# Patient Record
Sex: FEMALE | Race: BLACK OR AFRICAN AMERICAN | NOT HISPANIC OR LATINO | URBAN - METROPOLITAN AREA
[De-identification: names, ages, dates, MRNs, and addresses within clinical notes are randomized per-mention and may not be internally consistent; named-entity substitution may affect disease eponyms.]

---

## 2022-07-19 ENCOUNTER — EMERGENCY (EMERGENCY)
Facility: HOSPITAL | Age: 52
LOS: 0 days | Discharge: PSYCHIATRIC OR SUBSTANCE USE FACILITY | End: 2022-07-20
Attending: STUDENT IN AN ORGANIZED HEALTH CARE EDUCATION/TRAINING PROGRAM

## 2022-07-19 VITALS
SYSTOLIC BLOOD PRESSURE: 132 MMHG | WEIGHT: 119.93 LBS | RESPIRATION RATE: 18 BRPM | HEART RATE: 97 BPM | TEMPERATURE: 100 F | DIASTOLIC BLOOD PRESSURE: 88 MMHG | OXYGEN SATURATION: 100 % | HEIGHT: 62 IN

## 2022-07-19 VITALS
OXYGEN SATURATION: 99 % | RESPIRATION RATE: 18 BRPM | DIASTOLIC BLOOD PRESSURE: 79 MMHG | TEMPERATURE: 98 F | HEART RATE: 79 BPM | SYSTOLIC BLOOD PRESSURE: 136 MMHG

## 2022-07-19 DIAGNOSIS — F31.9 BIPOLAR DISORDER, UNSPECIFIED: ICD-10-CM

## 2022-07-19 DIAGNOSIS — Z85.07 PERSONAL HISTORY OF MALIGNANT NEOPLASM OF PANCREAS: ICD-10-CM

## 2022-07-19 DIAGNOSIS — R45.850 HOMICIDAL IDEATIONS: ICD-10-CM

## 2022-07-19 DIAGNOSIS — F33.2 MAJOR DEPRESSIVE DISORDER, RECURRENT SEVERE WITHOUT PSYCHOTIC FEATURES: ICD-10-CM

## 2022-07-19 DIAGNOSIS — N28.9 DISORDER OF KIDNEY AND URETER, UNSPECIFIED: ICD-10-CM

## 2022-07-19 DIAGNOSIS — Z79.82 LONG TERM (CURRENT) USE OF ASPIRIN: ICD-10-CM

## 2022-07-19 DIAGNOSIS — R45.851 SUICIDAL IDEATIONS: ICD-10-CM

## 2022-07-19 DIAGNOSIS — Z20.822 CONTACT WITH AND (SUSPECTED) EXPOSURE TO COVID-19: ICD-10-CM

## 2022-07-19 DIAGNOSIS — R74.01 ELEVATION OF LEVELS OF LIVER TRANSAMINASE LEVELS: ICD-10-CM

## 2022-07-19 DIAGNOSIS — I10 ESSENTIAL (PRIMARY) HYPERTENSION: ICD-10-CM

## 2022-07-19 LAB
ALBUMIN SERPL ELPH-MCNC: 3.2 G/DL — LOW (ref 3.3–5)
ALP SERPL-CCNC: 271 U/L — HIGH (ref 40–120)
ALT FLD-CCNC: 108 U/L — HIGH (ref 12–78)
AMPHET UR-MCNC: NEGATIVE — SIGNIFICANT CHANGE UP
ANION GAP SERPL CALC-SCNC: 7 MMOL/L — SIGNIFICANT CHANGE UP (ref 5–17)
APAP SERPL-MCNC: <2 UG/ML — LOW (ref 10–30)
AST SERPL-CCNC: 72 U/L — HIGH (ref 15–37)
BARBITURATES UR SCN-MCNC: NEGATIVE — SIGNIFICANT CHANGE UP
BASOPHILS # BLD AUTO: 0.01 K/UL — SIGNIFICANT CHANGE UP (ref 0–0.2)
BASOPHILS NFR BLD AUTO: 0.3 % — SIGNIFICANT CHANGE UP (ref 0–2)
BENZODIAZ UR-MCNC: NEGATIVE — SIGNIFICANT CHANGE UP
BILIRUB SERPL-MCNC: 0.4 MG/DL — SIGNIFICANT CHANGE UP (ref 0.2–1.2)
BUN SERPL-MCNC: 17 MG/DL — SIGNIFICANT CHANGE UP (ref 7–23)
CALCIUM SERPL-MCNC: 9 MG/DL — SIGNIFICANT CHANGE UP (ref 8.5–10.1)
CHLORIDE SERPL-SCNC: 115 MMOL/L — HIGH (ref 96–108)
CO2 SERPL-SCNC: 23 MMOL/L — SIGNIFICANT CHANGE UP (ref 22–31)
COCAINE METAB.OTHER UR-MCNC: NEGATIVE — SIGNIFICANT CHANGE UP
CREAT SERPL-MCNC: 1.33 MG/DL — HIGH (ref 0.5–1.3)
EGFR: 48 ML/MIN/1.73M2 — LOW
EOSINOPHIL # BLD AUTO: 0.03 K/UL — SIGNIFICANT CHANGE UP (ref 0–0.5)
EOSINOPHIL NFR BLD AUTO: 0.9 % — SIGNIFICANT CHANGE UP (ref 0–6)
ETHANOL SERPL-MCNC: <10 MG/DL — SIGNIFICANT CHANGE UP (ref 0–10)
FLUAV AG NPH QL: SIGNIFICANT CHANGE UP
FLUBV AG NPH QL: SIGNIFICANT CHANGE UP
GLUCOSE SERPL-MCNC: 89 MG/DL — SIGNIFICANT CHANGE UP (ref 70–99)
HCG SERPL-ACNC: 3 MIU/ML — SIGNIFICANT CHANGE UP
HCG UR QL: NEGATIVE — SIGNIFICANT CHANGE UP
HCT VFR BLD CALC: 31 % — LOW (ref 34.5–45)
HGB BLD-MCNC: 9.6 G/DL — LOW (ref 11.5–15.5)
IMM GRANULOCYTES NFR BLD AUTO: 0.3 % — SIGNIFICANT CHANGE UP (ref 0–1.5)
LYMPHOCYTES # BLD AUTO: 1.09 K/UL — SIGNIFICANT CHANGE UP (ref 1–3.3)
LYMPHOCYTES # BLD AUTO: 33.9 % — SIGNIFICANT CHANGE UP (ref 13–44)
MCHC RBC-ENTMCNC: 31 G/DL — LOW (ref 32–36)
MCHC RBC-ENTMCNC: 33.2 PG — SIGNIFICANT CHANGE UP (ref 27–34)
MCV RBC AUTO: 107.3 FL — HIGH (ref 80–100)
METHADONE UR-MCNC: NEGATIVE — SIGNIFICANT CHANGE UP
MONOCYTES # BLD AUTO: 0.29 K/UL — SIGNIFICANT CHANGE UP (ref 0–0.9)
MONOCYTES NFR BLD AUTO: 9 % — SIGNIFICANT CHANGE UP (ref 2–14)
NEUTROPHILS # BLD AUTO: 1.79 K/UL — LOW (ref 1.8–7.4)
NEUTROPHILS NFR BLD AUTO: 55.6 % — SIGNIFICANT CHANGE UP (ref 43–77)
NRBC # BLD: 0 /100 WBCS — SIGNIFICANT CHANGE UP (ref 0–0)
OPIATES UR-MCNC: POSITIVE — SIGNIFICANT CHANGE UP
PCP SPEC-MCNC: SIGNIFICANT CHANGE UP
PCP UR-MCNC: NEGATIVE — SIGNIFICANT CHANGE UP
PLATELET # BLD AUTO: 167 K/UL — SIGNIFICANT CHANGE UP (ref 150–400)
POTASSIUM SERPL-MCNC: 3.9 MMOL/L — SIGNIFICANT CHANGE UP (ref 3.5–5.3)
POTASSIUM SERPL-SCNC: 3.9 MMOL/L — SIGNIFICANT CHANGE UP (ref 3.5–5.3)
PROT SERPL-MCNC: 6.7 GM/DL — SIGNIFICANT CHANGE UP (ref 6–8.3)
RBC # BLD: 2.89 M/UL — LOW (ref 3.8–5.2)
RBC # FLD: 13.2 % — SIGNIFICANT CHANGE UP (ref 10.3–14.5)
SALICYLATES SERPL-MCNC: 7 MG/DL — SIGNIFICANT CHANGE UP (ref 2.8–20)
SARS-COV-2 RNA SPEC QL NAA+PROBE: SIGNIFICANT CHANGE UP
SODIUM SERPL-SCNC: 145 MMOL/L — SIGNIFICANT CHANGE UP (ref 135–145)
THC UR QL: NEGATIVE — SIGNIFICANT CHANGE UP
WBC # BLD: 3.22 K/UL — LOW (ref 3.8–10.5)
WBC # FLD AUTO: 3.22 K/UL — LOW (ref 3.8–10.5)

## 2022-07-19 PROCEDURE — 99285 EMERGENCY DEPT VISIT HI MDM: CPT

## 2022-07-19 PROCEDURE — 93010 ELECTROCARDIOGRAM REPORT: CPT

## 2022-07-19 PROCEDURE — 90792 PSYCH DIAG EVAL W/MED SRVCS: CPT | Mod: 95

## 2022-07-19 RX ORDER — QUETIAPINE FUMARATE 200 MG/1
0 TABLET, FILM COATED ORAL
Qty: 0 | Refills: 0 | DISCHARGE

## 2022-07-19 RX ORDER — POTASSIUM CHLORIDE 20 MEQ
10 PACKET (EA) ORAL
Qty: 0 | Refills: 0 | DISCHARGE

## 2022-07-19 RX ORDER — ENOXAPARIN SODIUM 100 MG/ML
0 INJECTION SUBCUTANEOUS
Qty: 0 | Refills: 0 | DISCHARGE

## 2022-07-19 RX ORDER — PANTOPRAZOLE SODIUM 20 MG/1
1 TABLET, DELAYED RELEASE ORAL
Qty: 0 | Refills: 0 | DISCHARGE

## 2022-07-19 RX ORDER — LIPASE/PROTEASE/AMYLASE 16-48-48K
2 CAPSULE,DELAYED RELEASE (ENTERIC COATED) ORAL
Qty: 0 | Refills: 0 | DISCHARGE

## 2022-07-19 RX ORDER — BUPROPION HYDROCHLORIDE 150 MG/1
300 TABLET, EXTENDED RELEASE ORAL
Qty: 0 | Refills: 0 | DISCHARGE

## 2022-07-19 RX ORDER — ASPIRIN/CALCIUM CARB/MAGNESIUM 324 MG
0 TABLET ORAL
Qty: 0 | Refills: 0 | DISCHARGE

## 2022-07-19 NOTE — ED PROVIDER NOTE - ATTENDING APP SHARED VISIT CONTRIBUTION OF CARE
ushah: 52f with bipolar p/w si and plan, denies ingestion; will c/w psych, 1:1, re-assess    NAD  EOMI  airway patent  +S1S2 no mrg  CTA b/l  soft nt nd  no le edema;  +SI

## 2022-07-19 NOTE — ED ADULT NURSE REASSESSMENT NOTE - NS ED NURSE REASSESS COMMENT FT1
pt is alert and awake. Pt is calm and cooperative. 1:1 remains in place. Awaiting labs and transfer. Will continue to monitor. Safety maintained

## 2022-07-19 NOTE — ED BEHAVIORAL HEALTH ASSESSMENT NOTE - RISK ASSESSMENT
High Acute Suicide Risk risk factors include suicide ideation w plan and preparatory action, prior attempt, anhedonia, recent separation from , prior hospitalizations, prior substance use disorder, recovering from pancreatic cancer. protective factors include engagement in treatment, treatment seeking behavior, sobriety x 4 years, supportive family, lack of psychosis, lack of access to firearm
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
## 2022-07-19 NOTE — ED PROVIDER NOTE - PROGRESS NOTE DETAILS
bronson: 52f with bipolar p/w si and plan, denies ingestion; will c/w psych teke psych aware Pt is medically cleared -- anemia is chronic and cancer is in remission, pt is well appearing w/o medical complaints and normal physical exam.

## 2022-07-19 NOTE — ED BEHAVIORAL HEALTH NOTE - BEHAVIORAL HEALTH NOTE
PRE-HOSPITAL COURSE:  SOURCE:  Second-hand information via EMR documentation and dayshift RN Aura.   DETAILS: Patient [facility] with c/o depression and SI.   ED COURSE:  SOURCE: Second-hand information via EMR documentation and dayshift RN Aura.  ARRIVAL:  Patient [facility] with c/o depression and SI.  BELONGINGS:  Clothing.  BEHAVIOR: Upon arrival patient noted to be tearful with racing thoughts – states her  is having an affair, while she is dealing with medical ailments which is the precipitating trigger for her SI. She states he has thoughts of crashing her car; however named her daughter and parents as protective factors. She denies HI, AVH and delusions. She is on a 1:1, AXO3 and gowned. Blood and urine obtained. Her eye contact and appearance are WNL. No aggression or behavioral issues reported.   TREATMENT: No security interventions, prns or behavioral modifications required.    VISITORS: Unaccompanied by family or social supports. PRE-HOSPITAL COURSE:  SOURCE:  Second-hand information via EMR documentation and dayshift RN Aura.   DETAILS: Patient [facility] with c/o depression and SI.   ED COURSE:  SOURCE: Second-hand information via EMR documentation and dayshift RN Aura.  ARRIVAL:  Patient [facility] with c/o depression and SI.  BELONGINGS:  Clothing.  BEHAVIOR: Upon arrival patient noted to be tearful with racing thoughts – states her  is having an affair, while she is dealing with medical ailments which is the precipitating trigger for her SI. She states she has thoughts of crashing her car; however named her daughter and parents as protective factors. She denies HI, AVH and delusions. She is on a 1:1, AXO3 and gowned. Blood and urine obtained. Her eye contact and appearance are WNL. No aggression or behavioral issues reported.   TREATMENT: No security interventions, prns or behavioral modifications required.    VISITORS: Unaccompanied by family or social supports.
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
## 2022-07-19 NOTE — ED BEHAVIORAL HEALTH ASSESSMENT NOTE - MEDICAL ISSUES AND PLAN (INCLUDE STANDING AND PRN MEDICATION)
please document that patient is in remission from pancreatic cancer, that patient is medically cleared despite anemia. please obtain pregnancy test (SO requests these). Continue pantoprazol 40mg PO qDay, enoxaparin 40mg qDay, opium tincture 0.6mg PO TID (confirmed via ISTOP), will need to clarify creon dosage mey Tamayo at [facility] in [place].
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
## 2022-07-19 NOTE — ED PROVIDER NOTE - CLINICAL SUMMARY MEDICAL DECISION MAKING FREE TEXT BOX
pt with h/o bipolar disorder pw si and hi towards  with normal exam and no medical complaints in the ED, pending labs (etoh) will consult telepsych

## 2022-07-19 NOTE — ED BEHAVIORAL HEALTH ASSESSMENT NOTE - NSBHSAOPI_PSY_A_CORE FT
reports prior opiate addiction clean x 4 years. She reports that she gets opium tincture TID for diarrhea related to pancreatic cancer (ISTOP Confirmed)

## 2022-07-19 NOTE — ED PROVIDER NOTE - IV ALTEPLASE INCLUSION HIDDEN
HPI:   Beni Durán is a [de-identified]year old female who presents for a MA (Medicare Advantage) Supervisit (Once per calendar year).     Patient reports that 1 week ago she arrived from [facility], she was treated for COVID-19 virus for 18 days at the hosp Active Problem List:     Osteoporosis     Essential hypertension with goal blood pressure less than 130/85     Hyperlipidemia LDL goal <100     Tortuous aorta (HCC)     History of glaucoma    Wt Readings from Last 3 Encounters:  07/28/21 : 144 lb 4 oz (65. show drugs.     REVIEW OF SYSTEMS:   Review of Systems   ROS:     Constitutional:  Negative for decreased activity, fever, irritability and lethargy  Cardiovascular:  Negative for chest pain and irregular heartbeat/palpitations  Respiratory:  Negative for cough responses: No I avoid social activities because I cannot hear well and fear I will reply improperly: No   Family members and friends have told me they think I may have hearing loss:  Yes                Visual Acuity                           Physical Exam RELEVANT CHRONIC CONDITIONS:   Jenaro De La Vega is a [de-identified]year old female who presents for a Medicare Assessment.      PLAN SUMMARY:   Diagnoses and all orders for this visit:    Physical exam, annual  Patient is up-to-date with vaccinations  History of your appetite been poor?: No  How does the patient maintain a good energy level?: Appropriate Exercise  How would you describe your daily physical activity?: Light  How would you describe your current health state?: Good  How do you maintain positive menta Sigmoidoscopy   Covered every 4 years -    Fecal Occult Blood Test Covered annually -   Bone Density Screening    Bone density screening    Covered every 2 years after age 72 if diagnosed with risk of osteoporosis or estrogen deficiency.     Covered yearly 07/28/2021       Drug Serum Conc Annually No results found for: DIGOXIN, DIG, VALP

## 2022-07-19 NOTE — ED ADULT TRIAGE NOTE - SOURCE OF INFORMATION
Chief Complaint       Chest Pain (x 3 days)      History of Present Illness   Source of history provided by:  patient. Matthew Shaver is a 25 y.o. old female presenting to the walk in clinic for evaluation of intermittent chest pain which has been present for the past 3 days. Patient states that the pain is located substernally and radiates up into her throat. She describes it as a burning sensation. She states that her symptoms seem to be exacerbated by laying down. She reports mild associated shortness of breath with acute pain episodes. States the pain does not radiate to the left/right shoulder, jaw, through the back, or to the opposite side of the chest.  Pain is not worse with exertion. Pt has not had pain like this before. She has not tried taking anything over-the-counter for symptomatic relief. Denies any N/V, diaphoresis, HA, fever, cough, or recent illness. No dizziness, syncope, palpitations, or edema. Denies any hx of asthma or tobacco use. Pt denies any recent sick exposures. Denies history of previous DVT/PE, recent travel/surgery/hospitalization, hormone use, tobacco use, or history of cancer. ROS    Unless otherwise stated in this report or unable to obtain because of the patient's clinical or mental status as evidenced by the medical record, this patients's positive and negative responses for Review of Systems, constitutional, psych, eyes, ENT, cardiovascular, respiratory, gastrointestinal, neurological, genitourinary, musculoskeletal, integument systems and systems related to the presenting problem are either stated in the preceding or were not pertinent or were negative for the symptoms and/or complaints related to the medical problem. Past Medical History:  has no past medical history on file. Past Surgical History:  has no past surgical history on file. Social History:  reports that she has never smoked.  She has never used smokeless tobacco.  Family History: family history is not on file. Allergies: Patient has no known allergies. Physical Exam         VS:  /72   Pulse 92   Temp 97.6 °F (36.4 °C) (Temporal)   Wt 142 lb (64.4 kg)   SpO2 99%    Oxygen Saturation Interpretation: Normal.    General Appearance/Constitutional:  Alert, development consistent with age, NAD. HEENT:  NC/NT. Airway patent. Neck:  Normal ROM. Supple. Lungs:  CTAB without wheezing, rales, or rhonchi. Heart:  Regular rate and rhythm, normal heart sounds, without pathological murmurs, ectopy, gallops, or rubs. Chest:  Symmetrical without visible rash or tenderness. Back:  No costovertebral tenderness. Skin:  Normal turgor. Warm, dry, without visible rash, unless noted elsewhere. Neurological:  Oriented. Motor functions intact. Psychiatric:  Pleasant and appropriate. Mood and affect are normal.    Lab / Imaging Results   (All laboratory and radiology results have been personally reviewed by myself)  Labs:  No results found for this visit on 10/21/21. Imaging: All Radiology results interpreted by Radiologist unless otherwise noted. Assessment / Plan     Impression(s):  Jennifer Norton was seen today for chest pain. Diagnoses and all orders for this visit:    Gastroesophageal reflux disease without esophagitis  -     omeprazole (PRILOSEC) 40 MG delayed release capsule; Take 1 capsule by mouth daily    Chest pain, unspecified type  -     EKG 12 lead; Future  -     EKG 12 lead  -     XR CHEST STANDARD (2 VW); Future  -     XR CHEST (2 VW)        Disposition:  Disposition: Discharge to home. Vitals reviewed which are stable. Wells score for PE is 0. EKG performed in office which revealed normal sinus rhythm without ST elevation, T inversion, or the presence of Q waves. Patient was also sent for a stat chest x-ray which came back negative for any acute process. Based on clinical picture and history, symptoms are most consistent with GERD.   Prescription written for Prilosec, side effects discussed. Patient advised to avoid spicy foods, caffeine, and alcohol to prevent exacerbation. Also advised to eat small meals frequently throughout the day and avoid eating immediately before bedtime to prevent exacerbation. Follow-up with PCP in 1 week if symptoms persist for recheck and further evaluation. ER immediately if symptoms worsen or change. ER with the development of severe worsening chest pain, dyspnea, palpitations, edema, dizziness, syncope, severe abdominal pain, vomiting, or fever. Patient states understanding and is in agreement with this care plan. All questions answered. Nahum Cody PA-C    **This report was transcribed using voice recognition software. Every effort was made to ensure accuracy; however, inadvertent computerized transcription errors may be present. Patient/EMS

## 2022-07-19 NOTE — ED PROVIDER NOTE - NS ED ATTENDING STATEMENT MOD
This was a shared visit with the BRAN. I reviewed and verified the documentation and independently performed the documented:

## 2022-07-19 NOTE — ED BEHAVIORAL HEALTH ASSESSMENT NOTE - CURRENT MEDICATION
Wellbutrin XL 300mg PO qDay, seroquel 350mg PO qHS  enoxaparin 40mg qDay (reports that she had to have hepatic stenting), opium tincture 0.6mg PO TID, potassium 10mg PO qDay, pantoprazole 40mg PO qDay, enalopril 10mg PO qDay, creon (unclear dose), OTC supplements for vitamins D3, A, iron, zinc
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
## 2022-07-19 NOTE — ED BEHAVIORAL HEALTH ASSESSMENT NOTE - DIFFERENTIAL
MDD recurrent severe without psychosis, r/o bipolar currently depressed, adjustment disorder, mood disorder secondary to general medical condition, adjustment

## 2023-05-17 PROBLEM — C25.9 MALIGNANT NEOPLASM OF PANCREAS, UNSPECIFIED: Chronic | Status: ACTIVE | Noted: 2022-07-19

## 2023-05-17 PROBLEM — I10 ESSENTIAL (PRIMARY) HYPERTENSION: Chronic | Status: ACTIVE | Noted: 2022-07-19

## 2023-05-17 PROBLEM — F32.9 MAJOR DEPRESSIVE DISORDER, SINGLE EPISODE, UNSPECIFIED: Chronic | Status: ACTIVE | Noted: 2022-07-19

## 2023-05-18 PROBLEM — Z00.00 ENCOUNTER FOR PREVENTIVE HEALTH EXAMINATION: Status: ACTIVE | Noted: 2023-05-18

## 2023-06-26 ENCOUNTER — NON-APPOINTMENT (OUTPATIENT)
Age: 53
End: 2023-06-26

## 2023-06-26 ENCOUNTER — APPOINTMENT (OUTPATIENT)
Dept: OTOLARYNGOLOGY | Facility: CLINIC | Age: 53
End: 2023-06-26
Payer: COMMERCIAL

## 2023-06-26 VITALS
HEIGHT: 62 IN | SYSTOLIC BLOOD PRESSURE: 132 MMHG | DIASTOLIC BLOOD PRESSURE: 83 MMHG | BODY MASS INDEX: 20.24 KG/M2 | WEIGHT: 110 LBS | TEMPERATURE: 97.3 F | HEART RATE: 85 BPM

## 2023-06-26 PROCEDURE — 99203 OFFICE O/P NEW LOW 30 MIN: CPT

## 2023-06-26 RX ORDER — QUETIAPINE 400 MG/1
400 TABLET, FILM COATED ORAL
Refills: 0 | Status: ACTIVE | COMMUNITY

## 2023-06-26 RX ORDER — BUPROPION HYDROCHLORIDE 100 MG/1
TABLET, FILM COATED ORAL
Refills: 0 | Status: ACTIVE | COMMUNITY

## 2023-06-26 RX ORDER — MORPHINE TINCTURE 1 G/100ML
SOLUTION ORAL
Refills: 0 | Status: ACTIVE | COMMUNITY

## 2023-06-26 RX ORDER — ENALAPRIL MALEATE 10 MG/1
10 TABLET ORAL
Refills: 0 | Status: ACTIVE | COMMUNITY

## 2023-06-26 RX ORDER — LORAZEPAM 2 MG/1
TABLET ORAL
Refills: 0 | Status: ACTIVE | COMMUNITY

## 2023-06-26 RX ORDER — ENOXAPARIN SODIUM 300 MG/3ML
INJECTION INTRAVENOUS; SUBCUTANEOUS
Refills: 0 | Status: ACTIVE | COMMUNITY

## 2023-06-26 RX ORDER — PANTOPRAZOLE 40 MG/1
40 TABLET, DELAYED RELEASE ORAL
Refills: 0 | Status: ACTIVE | COMMUNITY

## 2023-06-26 NOTE — ASSESSMENT
[FreeTextEntry1] : 53 year old female with hx of pancreatic cancer presents with bilateral parotid gland swelling. On exam, clear flow bilateral parotid glands. \par - CT scan indicated enlarged bilateral heterogenous parotid with subcentimeter sialoliths. \par \par \par Discussed options for recurrent parotid sialoadenitis- observation with conservative management versus interventional sialoendoscopy vs excision of gland. \par Discussed details of the regiment/procedures and risks of each including but not limited to:\par interventional sialoendoscopy - bleeding, infection, scarring, inability to remove stone, ductal perforation/avulsion, injury to surrounding nerves, seroma, persistent or worsening of sx.\par Combined approach - increased risk of bleeding and infections injury to surrounding nerves including facial nerved and sensory nerves. \par complete gland removal - external scar, injury to facial nerve etc. \par \par will start with conservative Tx - not painful or get variable swelling but are enlarged all the time, will see where stones are - i nthe gland vs duct. if in the gland may consider office dilation vs obs\par Will proceed with regiment to increase salivary flow to reduce pooling in the gland and washout any possible obstruction if possible. Recommended hydration, regular massage, sour candies, and warm compress\par

## 2023-06-26 NOTE — CONSULT LETTER
[Please see my note below.] : Please see my note below. [FreeTextEntry1] : Dear Dr. SONIA JACKSON \par I had the pleasure of evaluating your patient JOAO HOBSON, thank you for allowing us to participate in their care. please see full note detailing our visit below.\par If you have any questions, please do not hesitate to call me and I would be happy to discuss further. \par \par Chucho Sanchez M.D.\par Attending Physician,  \par Department of Otolaryngology - Head and Neck Surgery\par [facility] \par Office: [phone]\par Fax: [phone]\par

## 2023-06-26 NOTE — HISTORY OF PRESENT ILLNESS
[de-identified] : 53 year old with hx of pancreatic cancer and has had salivary stones in both cheeks for about a year referred to us by Dr Canada at [facility]. Patient states puffiness flares up at times and goes down, but usually consistently swollen. Denies pain. Denies dry mouth or eyes. Occasional stiff neck. Had 9 months of chemo and 1 month of radiation for cancer, had a whipple surgery too. \par \par CT scan indicated enlarged bilateral heterogenous parotid with subcentimeter sialoliths.

## 2023-06-26 NOTE — END OF VISIT
[FreeTextEntry3] : I personally saw and examined  the patient in detail.  I spoke to VIDAL Meadows regarding the assessment and plan of care. I performed the procedures and relevant physical exam.  I have reviewed the above assessment and plan of care and I agree.  I have made changes to the body of the note wherever necessary and appropriate

## 2023-08-14 ENCOUNTER — APPOINTMENT (OUTPATIENT)
Dept: OTOLARYNGOLOGY | Facility: CLINIC | Age: 53
End: 2023-08-14
Payer: COMMERCIAL

## 2023-08-14 VITALS
SYSTOLIC BLOOD PRESSURE: 138 MMHG | HEART RATE: 81 BPM | BODY MASS INDEX: 20.24 KG/M2 | WEIGHT: 110 LBS | TEMPERATURE: 97.2 F | HEIGHT: 62 IN | DIASTOLIC BLOOD PRESSURE: 86 MMHG

## 2023-08-14 PROCEDURE — 99214 OFFICE O/P EST MOD 30 MIN: CPT

## 2023-08-14 NOTE — ASSESSMENT
[FreeTextEntry1] : 53 year old female with hx of pancreatic cancer presents with bilateral parotid gland swelling. On exam, clear flow bilateral parotid glands.  - CT scan indicated enlarged bilateral heterogenous parotid with subcentimeter sialoliths.  CT images reviewed - looked to be in the gland, no clear stones seen in the duct   Discussed options for recurrent parotid sialoadenitis- observation with conservative management versus interventional sialoendoscopy vs excision of gland.  Discussed details of the regiment/procedures and risks of each including but not limited to: interventional sialoendoscopy or office sialodocoplasty - bleeding, infection, scarring, inability to remove stone, ductal perforation/avulsion, injury to surrounding nerves, seroma, persistent or worsening of sx. Combined approach - increased risk of bleeding and infections injury to surrounding nerves including facial nerved and sensory nerves.  complete gland removal - external scar, injury to facial nerve etc.  Would like to do sialoendoscopy. Will book for procedure.  will start with conservative Tx - not painful or get variable swelling but are enlarged all the time, unclear if procedure will fix it, as may be full gland enlargement, she would like to try a dilation/sialodocoplasty, she is aware has a chance of getting worse  Will proceed with regiment to increase salivary flow to reduce pooling in the gland and washout any possible obstruction if possible. Recommended hydration, regular massage, sour candies, and warm compress - will book for bilateral parotid sialodochoplasty in office to see if this helps. Patient does take anticoagulation

## 2023-08-14 NOTE — CONSULT LETTER
[Please see my note below.] : Please see my note below. [FreeTextEntry1] : Dear Dr. SONIA JACKSON \par  I had the pleasure of evaluating your patient JOAO HOBSON, thank you for allowing us to participate in their care. please see full note detailing our visit below.\par  If you have any questions, please do not hesitate to call me and I would be happy to discuss further. \par  \par  Chucho Sanchez M.D.\par  Attending Physician,  \par  Department of Otolaryngology - Head and Neck Surgery\par  [facility] \par  Office: [phone]\par  Fax: [phone]\par

## 2023-08-14 NOTE — HISTORY OF PRESENT ILLNESS
[de-identified] : 53 year old with hx of pancreatic cancer and has had salivary stones in both cheeks for about a year referred to us by Dr Canada at [facility]. Patient states puffiness flares up at times and goes down, but usually consistently swollen. Denies pain. Denies dry mouth or eyes. Occasional stiff neck. Had 9 months of chemo and 1 month of radiation for cancer, had a whipple surgery too. \par  \par  CT scan indicated enlarged bilateral heterogenous parotid with subcentimeter sialoliths.  [FreeTextEntry1] : Pt following up for b/l paroid gland swelling. Pt states feeling better. Denies pain. Experiencing occasional stiff neck. Continues to do the facial massages. Still notices mild swelling.

## 2023-08-23 ENCOUNTER — NON-APPOINTMENT (OUTPATIENT)
Age: 53
End: 2023-08-23

## 2023-08-23 RX ORDER — AMOXICILLIN AND CLAVULANATE POTASSIUM 875; 125 MG/1; MG/1
875-125 TABLET, COATED ORAL
Qty: 20 | Refills: 0 | Status: ACTIVE | COMMUNITY
Start: 2023-08-23 | End: 1900-01-01

## 2023-08-23 RX ORDER — METHYLPREDNISOLONE 4 MG/1
4 TABLET ORAL
Qty: 1 | Refills: 0 | Status: ACTIVE | COMMUNITY
Start: 2023-08-23 | End: 1900-01-01

## 2023-09-11 ENCOUNTER — APPOINTMENT (OUTPATIENT)
Dept: OTOLARYNGOLOGY | Facility: CLINIC | Age: 53
End: 2023-09-11

## 2023-09-28 ENCOUNTER — APPOINTMENT (OUTPATIENT)
Dept: OTOLARYNGOLOGY | Facility: CLINIC | Age: 53
End: 2023-09-28
Payer: COMMERCIAL

## 2023-09-28 VITALS
TEMPERATURE: 97.6 F | DIASTOLIC BLOOD PRESSURE: 81 MMHG | HEIGHT: 62 IN | WEIGHT: 110 LBS | HEART RATE: 72 BPM | SYSTOLIC BLOOD PRESSURE: 143 MMHG | BODY MASS INDEX: 20.24 KG/M2

## 2023-09-28 PROCEDURE — 42505 REPAIR SALIVARY DUCT: CPT

## 2023-10-12 ENCOUNTER — APPOINTMENT (OUTPATIENT)
Dept: OTOLARYNGOLOGY | Facility: CLINIC | Age: 53
End: 2023-10-12
Payer: COMMERCIAL

## 2023-10-12 VITALS
BODY MASS INDEX: 20.24 KG/M2 | TEMPERATURE: 98 F | HEIGHT: 62 IN | WEIGHT: 110 LBS | HEART RATE: 89 BPM | DIASTOLIC BLOOD PRESSURE: 93 MMHG | SYSTOLIC BLOOD PRESSURE: 136 MMHG

## 2023-10-12 PROCEDURE — 99024 POSTOP FOLLOW-UP VISIT: CPT

## 2023-12-04 ENCOUNTER — APPOINTMENT (OUTPATIENT)
Dept: OTOLARYNGOLOGY | Facility: CLINIC | Age: 53
End: 2023-12-04

## 2024-01-22 ENCOUNTER — APPOINTMENT (OUTPATIENT)
Dept: OTOLARYNGOLOGY | Facility: CLINIC | Age: 54
End: 2024-01-22
Payer: COMMERCIAL

## 2024-01-22 VITALS
BODY MASS INDEX: 19.32 KG/M2 | TEMPERATURE: 97.3 F | HEART RATE: 86 BPM | DIASTOLIC BLOOD PRESSURE: 93 MMHG | SYSTOLIC BLOOD PRESSURE: 143 MMHG | HEIGHT: 62 IN | WEIGHT: 105 LBS

## 2024-01-22 DIAGNOSIS — K11.5 SIALOLITHIASIS: ICD-10-CM

## 2024-01-22 DIAGNOSIS — K11.23 CHRONIC SIALOADENITIS: ICD-10-CM

## 2024-01-22 PROCEDURE — 99213 OFFICE O/P EST LOW 20 MIN: CPT

## 2024-01-22 NOTE — ASSESSMENT
[FreeTextEntry1] : Patient s/p office bilateral parotid sialodochoplasty on 09/28. On exam, clear flow bilateral parotid gland.  feels swellings than pre-procedure but still would like to consider botox  - continue hydration, massages, and sour candies - discussed possibility of cosmetic reduction of size of glands, possible botox injections. will see Dr. Patterson and consider botox

## 2024-01-22 NOTE — HISTORY OF PRESENT ILLNESS
[de-identified] : 53 year old with hx of pancreatic cancer and has had salivary stones in both cheeks for about a year referred to us by Dr Canada at [facility]. Patient states puffiness flares up at times and goes down, but usually consistently swollen. Denies pain. Denies dry mouth or eyes. Occasional stiff neck. Had 9 months of chemo and 1 month of radiation for cancer, had a whipple surgery too.   CT scan indicated enlarged bilateral heterogenous parotid with subcentimeter sialoliths.   Patient s/p office bilateral parotid sialodochoplasty on 09/28. States feels well but face is still swollen. Stitches and stents fell out of both sides. Doing massages as often as she can, 2-3 times a week. Finished antibiotics and steroids.  [FreeTextEntry1] : Patient s/p office bilateral parotid sialodochoplasty on 09/28. Has occasional flare ups but does think it flows better when she massages.  feels less swelling than pre-procedure

## 2024-01-22 NOTE — REASON FOR VISIT
[Subsequent Evaluation] : a subsequent evaluation for [FreeTextEntry2] : s/p office bilateral parotid sialodochoplasty on 09/28

## 2024-01-22 NOTE — CONSULT LETTER
[Please see my note below.] : Please see my note below. [FreeTextEntry1] : Dear Dr. SONIA JACKSON \par  I had the pleasure of evaluating your patient JOAO HOBSON, thank you for allowing us to participate in their care. please see full note detailing our visit below.\par  If you have any questions, please do not hesitate to call me and I would be happy to discuss further. \par  \par  Chucho Sanchez M.D.\par  Attending Physician,  \par  Department of Otolaryngology - Head and Neck Surgery\par  [facility] \par  Office: [phone]\par  Fax: [phone]\par

## 2024-06-24 NOTE — ED BEHAVIORAL HEALTH ASSESSMENT NOTE - AXIS III
Last OV: 6/11/2024  Next OV: 12/11/2024        Last fill: 3/25/24  Refills: 0      
Pancreatic cancer now in remission s/p chemo, radiation, surgery in 4/2021, with related anemia, kidney disease, hepatic enzyme elevation

## 2025-07-14 ENCOUNTER — NON-APPOINTMENT (OUTPATIENT)
Age: 55
End: 2025-07-14